# Patient Record
(demographics unavailable — no encounter records)

---

## 2017-03-28 NOTE — ED PDOC
Arrival/HPI





- General


Chief Complaint: ENT Problem


Time Seen by Provider: 03/28/17 23:04


Historian: Patient





- History of Present Illness


Narrative History of Present Illness (Text): 


03/28/17 23:17


Jeanne Peters is a 59 year old female who presents to the emergency department 

for evaluation s/p choking on a piece of steak prior to arrival. Patient 

reports that EMS was able to partially dislodged the stuck food by performing 

the Heimlich maneuver. Patient states she still feels like "there is something 

stuck in her throat" and reports that she is unable to swallow anything. She is 

able to speak without any difficulty. Denies any fever, chills, chest pain, 

difficulty breathing, or any other complaints at this time.  





Time/Duration: Prior to Arrival


Symptom Onset: Sudden


Symptom Course: Unchanged


Severity Level: Mild


Activities at Onset: Eating





Past Medical History





- Provider Review


Nursing Documentation Reviewed: Yes





- Infectious Disease


Hx of Infectious Diseases: None





- Reproductive


Menopause: Yes





- Psychiatric


Hx Substance Use: No





- Anesthesia


Hx Anesthesia: No





Family/Social History





- Physician Review


Nursing Documentation Reviewed: Yes


Family/Social History: No Known Family HX


Smoking Status: Light Smoker < 10 Cigarettes Daily


Hx Alcohol Use: Yes


Frequency of alcohol use: Daily


Hx Substance Use: No





Allergies/Home Meds


Allergies/Adverse Reactions: 


Allergies





No Known Allergies Allergy (Verified 03/28/17 23:15)


 








Home Medications: 


 Home Meds











 Medication  Instructions  Recorded  Confirmed


 


No Known Home Med  03/28/17 03/28/17














Review of Systems





- Physician Review


All systems were reviewed & negative as marked: Yes





- Review of Systems


Constitutional: Normal.  absent: Fatigue, Fevers


Eyes: Normal


ENT: Other ("food stuck in throat" )


Respiratory: Normal.  absent: SOB, Cough


Cardiovascular: Normal.  absent: Chest Pain


Gastrointestinal: Normal.  absent: Nausea, Vomiting


Genitourinary Female: Normal


Psychiatric: Normal





Physical Exam


Vital Signs Reviewed: Yes


Vital Signs











  Temp Pulse Resp BP Pulse Ox


 


 03/29/17 02:16  98.6 F  81  18  126/79  97


 


 03/28/17 23:07  98.6 F  101 H  18  153/87 H  97











Temperature: Afebrile


Blood Pressure: Normal


Pulse: Tachycardic


Respiratory Rate: Normal


Appearance: Positive for: Well-Appearing, Non-Toxic, Comfortable


Pain Distress: None


Mental Status: Positive for: Alert and Oriented X 3





- Systems Exam


Head: Present: Atraumatic, Normocephalic


Pupils: Present: PERRL


Extroacular Muscles: Present: EOMI


Conjunctiva: Present: Normal


Mouth: Present: Moist Mucous Membranes


Pharnyx: Present: ERYTHEMA.  No: EXUDATE, TONSILS ENLARGED


Neck: Present: Normal Range of Motion


Respiratory/Chest: Present: Clear to Auscultation, Good Air Exchange.  No: 

Respiratory Distress, Accessory Muscle Use


Cardiovascular: Present: Regular Rate and Rhythm, Normal S1, S2.  No: Murmurs


Abdomen: Present: Normal Bowel Sounds.  No: Tenderness, Distention, Peritoneal 

Signs, Rebound, Guarding


Upper Extremity: Present: Normal Inspection.  No: Cyanosis, Edema


Lower Extremity: Present: Normal Inspection.  No: Edema


Neurological: Present: GCS=15, CN II-XII Intact, Speech Normal, Motor Func 

Grossly Intact, Normal Sensory Function


Skin: Present: Warm, Dry, Normal Color.  No: Rashes


Psychiatric: Present: Alert, Oriented x 3, Normal Insight, Normal Concentration





Medical Decision Making


ED Course and Treatment: 


03/28/17 23:29


Impression: A 59 year old female who presents to the emergency department for 

evaluation s/p choking on piece of steak. Food was partially dislodged by 

performing heimlich maneuver by EMS. 





Plan:


-- CT neck


-- Labs


-- Glucagon 


-- Reassess and disposition








Progress Notes:


Will place patient on EDObs, pending CT scan, reevaluation and disposition. 





Re-evaluation Time: 03:19


Reassessment Condition: Re-examined, Improved





- Lab Interpretations


Lab Results: 








 03/29/17 00:02 





 Lab Results





03/29/17 00:02: Sodium 151 H, Potassium 4.1, Chloride 103, Carbon Dioxide 25, 

Anion Gap 27 H, BUN 9, Creatinine 0.7, Est GFR (African Amer) > 60, Est GFR (Non

-Af Amer) > 60, Random Glucose 161 H, Calcium 9.6, Total Bilirubin 0.6, AST 26, 

ALT 19, Alkaline Phosphatase 62, Total Protein 8.4 H, Albumin 4.5, Globulin 3.9

, Albumin/Globulin Ratio 1.2








I have reviewed the lab results: Yes





- RAD Interpretation


Radiology Orders: 








03/28/17 23:17


NECK SOFT TISSUE W/O CONTRAST [CT] Stat 











: Radiologist





- Medication Orders


Current Medication Orders: 











Discontinued Medications





Glucagon (Glucagen Diagnostic Kit)  1 mg IV STAT STA


   Stop: 03/28/17 23:18


   Last Admin: 03/29/17 00:04  Dose: 1 MG





eMAR Start Stop


 Document     03/29/17 00:04  OCS  (Rec: 03/29/17 00:04  OCS  [id number])


     Intravenous Solution


      Start Date                                 03/29/17


      Start Time                                 00:04





Ondansetron HCl (Zofran Inj)  4 mg IVP STAT STA


   Stop: 03/29/17 01:35


   Last Admin: 03/29/17 02:07  Dose: 4 MG





IVP Administration


 Document     03/29/17 02:07  OCS  (Rec: 03/29/17 02:07  OCS  [id number])


     Charges for Administration


      # of IVP Administrations                   1














ED OBSERVATION


Discharge: Yes


Date of observation admission: 03/29/17


Time of observation admission: 00:05





- Observation admission statement


Patient is being placed in observation because:: 


food impaction 





- Goals of Observation


Goals of observation are:: 


Awaiting CT scan, and reevaluation





- Progress Note


Progress Note: 


03/29/17 00:20


Patient unable to tolerate PO intake. Resting comfortably. No difficulty 

speaking and protecting her airway.





03/29/17 00:45


CT neck results reviewed:


IMPRESSION:


1. Debris within the proximal esophagus at approximately the C7-T1 level 

distends the esophagus


and measures 2.6 x 1.5 cm. This may reflect food that is stuck in the esophagus 

or foreign body


though no high-density radiopacities observed to suggest bone. Clinical 

correlation recommended.


2. Right hypodense thyroid nodule(s) are nonspecific. Further evaluation with 

dedicated thyroid


ultrasound can be performed as clinically indicated.





03/29/17 00:40


Case discussed with GI Dr. Madrid who says he will evaluate patient at 7 A.M 

today. 





03/29/17 03:00


Family states that patient "felt" the food go down. She is able to tolerate PO 

intake in emergency department.





Patient is stable for discharge. Advised to present back to emergency 

department for worsening symptoms. 











- Scribe Statement


The provider has reviewed the documentation as recorded by the Hola Bello 


Provider Attestation: 





All medical record entries made by the Corrineibchristy were at my direction and 

personally dictated by me. I have reviewed the chart and agree that the record 

accurately reflects my personal performance of the history, physical exam, 

medical decision making, and the department course for this patient. I have 

also personally directed, reviewed, and agree with the discharge instructions 

and disposition.





Disposition/Present on Arrival





- Present on Arrival


Any Indicators Present on Arrival: No


History of DVT/PE: No


History of Uncontrolled Diabetes: No


Urinary Catheter: No


History of Decub. Ulcer: No


History Surgical Site Infection Following: None





- Disposition


Have Diagnosis and Disposition been Completed?: Yes


Diagnosis: 


 Esophageal foreign body


Disposition: HOME/ ROUTINE


Disposition Time: 03:20


Condition: GOOD

## 2017-03-29 NOTE — CT
PROCEDURE:  CT NECK WITHOUT  CONTRAST



HISTORY:

r/o fb



COMPARISON:

None.



TECHNIQUE:

CT of the neck without intravenous contrast. Coronal and sagittal 

reformats generated. 



Radiation dose:  mGy-cm



FINDINGS:



NASOPHARYNX:

Unremarkable.



SUPRAHYOID NECK:

Unremarkable oropharynx, oral cavity, parapharyngeal space and 

retropharyngeal space.



INFRAHYOID NECK:

Unremarkable larynx, hypopharynx, and supraglottic space. Vocal cords 

intact.



MASS:

None.



GLANDS:

Parotid and submandibular glands unremarkable. There is a small 

right-sided thyroid nodule



LYMPH NODES:

Normal. No lymphadenopathy.



CERVICAL SPINE:

No fracture or focal lesion. 



OTHER FINDINGS:

There is food debris in the esophagus at the C7-T1 level measuring 

approximately 1.5 x 2.6 cm.



The report concurs with the preliminary [facility] report



IMPRESSION:

Food debris in the cervical esophagus.  No evidence of dense bony 

foreign body